# Patient Record
Sex: FEMALE | Race: WHITE | NOT HISPANIC OR LATINO | Employment: OTHER | URBAN - METROPOLITAN AREA
[De-identification: names, ages, dates, MRNs, and addresses within clinical notes are randomized per-mention and may not be internally consistent; named-entity substitution may affect disease eponyms.]

---

## 2022-01-01 ENCOUNTER — LAB REQUISITION (OUTPATIENT)
Dept: LAB | Facility: CLINIC | Age: 87
End: 2022-01-01
Payer: COMMERCIAL

## 2022-01-01 ENCOUNTER — HOSPITAL ENCOUNTER (EMERGENCY)
Facility: CLINIC | Age: 87
Discharge: HOME | End: 2022-05-04
Attending: EMERGENCY MEDICINE | Admitting: EMERGENCY MEDICINE
Payer: COMMERCIAL

## 2022-01-01 ENCOUNTER — APPOINTMENT (OUTPATIENT)
Dept: GENERAL RADIOLOGY | Facility: CLINIC | Age: 87
End: 2022-01-01
Attending: EMERGENCY MEDICINE
Payer: COMMERCIAL

## 2022-01-01 ENCOUNTER — APPOINTMENT (OUTPATIENT)
Dept: CT IMAGING | Facility: CLINIC | Age: 87
End: 2022-01-01
Attending: EMERGENCY MEDICINE
Payer: COMMERCIAL

## 2022-01-01 VITALS
DIASTOLIC BLOOD PRESSURE: 67 MMHG | RESPIRATION RATE: 16 BRPM | HEART RATE: 77 BPM | SYSTOLIC BLOOD PRESSURE: 126 MMHG | OXYGEN SATURATION: 95 % | TEMPERATURE: 97.3 F

## 2022-01-01 DIAGNOSIS — N30.00 ACUTE CYSTITIS WITHOUT HEMATURIA: ICD-10-CM

## 2022-01-01 DIAGNOSIS — I10 ESSENTIAL (PRIMARY) HYPERTENSION: ICD-10-CM

## 2022-01-01 DIAGNOSIS — G93.40 ENCEPHALOPATHY, UNSPECIFIED: ICD-10-CM

## 2022-01-01 DIAGNOSIS — E03.9 HYPOTHYROIDISM, UNSPECIFIED: ICD-10-CM

## 2022-01-01 DIAGNOSIS — R29.6 FALLS FREQUENTLY: ICD-10-CM

## 2022-01-01 DIAGNOSIS — E21.3 HYPERPARATHYROIDISM, UNSPECIFIED (H): ICD-10-CM

## 2022-01-01 DIAGNOSIS — M54.50 MIDLINE LOW BACK PAIN WITHOUT SCIATICA, UNSPECIFIED CHRONICITY: ICD-10-CM

## 2022-01-01 DIAGNOSIS — M25.552 HIP PAIN, LEFT: ICD-10-CM

## 2022-01-01 DIAGNOSIS — M81.0 AGE-RELATED OSTEOPOROSIS WITHOUT CURRENT PATHOLOGICAL FRACTURE: ICD-10-CM

## 2022-01-01 DIAGNOSIS — E83.52 HYPERCALCEMIA: ICD-10-CM

## 2022-01-01 LAB
ALBUMIN SERPL-MCNC: 3.6 G/DL (ref 3.4–5)
ALBUMIN UR-MCNC: NEGATIVE MG/DL
ALP SERPL-CCNC: 106 U/L (ref 40–150)
ALT SERPL W P-5'-P-CCNC: 16 U/L (ref 0–50)
ANION GAP SERPL CALCULATED.3IONS-SCNC: 7 MMOL/L (ref 3–14)
ANION GAP SERPL CALCULATED.3IONS-SCNC: 9 MMOL/L (ref 3–14)
APPEARANCE UR: ABNORMAL
AST SERPL W P-5'-P-CCNC: 14 U/L (ref 0–45)
BACTERIA UR CULT: ABNORMAL
BASOPHILS # BLD AUTO: 0 10E3/UL (ref 0–0.2)
BASOPHILS NFR BLD AUTO: 0 %
BILIRUB SERPL-MCNC: 0.3 MG/DL (ref 0.2–1.3)
BILIRUB UR QL STRIP: NEGATIVE
BUN SERPL-MCNC: 25 MG/DL (ref 7–30)
BUN SERPL-MCNC: 35 MG/DL (ref 7–30)
CALCIUM SERPL-MCNC: 10.3 MG/DL (ref 8.5–10.1)
CALCIUM SERPL-MCNC: 10.9 MG/DL (ref 8.5–10.1)
CHLORIDE BLD-SCNC: 101 MMOL/L (ref 94–109)
CHLORIDE BLD-SCNC: 104 MMOL/L (ref 94–109)
CO2 SERPL-SCNC: 26 MMOL/L (ref 20–32)
CO2 SERPL-SCNC: 28 MMOL/L (ref 20–32)
COLOR UR AUTO: YELLOW
CREAT SERPL-MCNC: 1.02 MG/DL (ref 0.52–1.04)
CREAT SERPL-MCNC: 1.2 MG/DL (ref 0.52–1.04)
DEPRECATED CALCIDIOL+CALCIFEROL SERPL-MC: 59 UG/L (ref 20–75)
EOSINOPHIL # BLD AUTO: 0.3 10E3/UL (ref 0–0.7)
EOSINOPHIL NFR BLD AUTO: 4 %
ERYTHROCYTE [DISTWIDTH] IN BLOOD BY AUTOMATED COUNT: 14.7 % (ref 10–15)
ERYTHROCYTE [DISTWIDTH] IN BLOOD BY AUTOMATED COUNT: 14.8 % (ref 10–15)
GFR SERPL CREATININE-BSD FRML MDRD: 43 ML/MIN/1.73M2
GFR SERPL CREATININE-BSD FRML MDRD: 52 ML/MIN/1.73M2
GLUCOSE BLD-MCNC: 101 MG/DL (ref 70–99)
GLUCOSE BLD-MCNC: 88 MG/DL (ref 70–99)
GLUCOSE UR STRIP-MCNC: NEGATIVE MG/DL
HCT VFR BLD AUTO: 37.6 % (ref 35–47)
HCT VFR BLD AUTO: 39.2 % (ref 35–47)
HGB BLD-MCNC: 12.3 G/DL (ref 11.7–15.7)
HGB BLD-MCNC: 12.4 G/DL (ref 11.7–15.7)
HGB UR QL STRIP: ABNORMAL
HYALINE CASTS: 7 /LPF
IMM GRANULOCYTES # BLD: 0.1 10E3/UL
IMM GRANULOCYTES NFR BLD: 1 %
KETONES UR STRIP-MCNC: NEGATIVE MG/DL
LEUKOCYTE ESTERASE UR QL STRIP: ABNORMAL
LYMPHOCYTES # BLD AUTO: 1.3 10E3/UL (ref 0.8–5.3)
LYMPHOCYTES NFR BLD AUTO: 18 %
MCH RBC QN AUTO: 30 PG (ref 26.5–33)
MCH RBC QN AUTO: 30.4 PG (ref 26.5–33)
MCHC RBC AUTO-ENTMCNC: 31.6 G/DL (ref 31.5–36.5)
MCHC RBC AUTO-ENTMCNC: 32.7 G/DL (ref 31.5–36.5)
MCV RBC AUTO: 93 FL (ref 78–100)
MCV RBC AUTO: 95 FL (ref 78–100)
MONOCYTES # BLD AUTO: 0.5 10E3/UL (ref 0–1.3)
MONOCYTES NFR BLD AUTO: 7 %
MUCOUS THREADS #/AREA URNS LPF: PRESENT /LPF
NEUTROPHILS # BLD AUTO: 5.5 10E3/UL (ref 1.6–8.3)
NEUTROPHILS NFR BLD AUTO: 70 %
NITRATE UR QL: POSITIVE
NRBC # BLD AUTO: 0 10E3/UL
NRBC BLD AUTO-RTO: 0 /100
PH UR STRIP: 5 [PH] (ref 5–7)
PLATELET # BLD AUTO: 263 10E3/UL (ref 150–450)
PLATELET # BLD AUTO: 284 10E3/UL (ref 150–450)
POTASSIUM BLD-SCNC: 3.7 MMOL/L (ref 3.4–5.3)
POTASSIUM BLD-SCNC: 4.4 MMOL/L (ref 3.4–5.3)
PROT SERPL-MCNC: 7.5 G/DL (ref 6.8–8.8)
PTH-INTACT SERPL-MCNC: 24 PG/ML (ref 18–80)
RBC # BLD AUTO: 4.05 10E6/UL (ref 3.8–5.2)
RBC # BLD AUTO: 4.13 10E6/UL (ref 3.8–5.2)
RBC URINE: 2 /HPF
SARS-COV-2 RNA RESP QL NAA+PROBE: NEGATIVE
SODIUM SERPL-SCNC: 136 MMOL/L (ref 133–144)
SODIUM SERPL-SCNC: 139 MMOL/L (ref 133–144)
SP GR UR STRIP: 1.02 (ref 1–1.03)
SQUAMOUS EPITHELIAL: 2 /HPF
T4 FREE SERPL-MCNC: 1.07 NG/DL (ref 0.76–1.46)
TSH SERPL DL<=0.005 MIU/L-ACNC: 18.03 MU/L (ref 0.4–4)
UROBILINOGEN UR STRIP-MCNC: NORMAL MG/DL
WBC # BLD AUTO: 5.8 10E3/UL (ref 4–11)
WBC # BLD AUTO: 7.7 10E3/UL (ref 4–11)
WBC URINE: 22 /HPF

## 2022-01-01 PROCEDURE — 36415 COLL VENOUS BLD VENIPUNCTURE: CPT | Performed by: EMERGENCY MEDICINE

## 2022-01-01 PROCEDURE — 36415 COLL VENOUS BLD VENIPUNCTURE: CPT | Mod: ORL | Performed by: PHYSICIAN ASSISTANT

## 2022-01-01 PROCEDURE — 82306 VITAMIN D 25 HYDROXY: CPT | Mod: ORL | Performed by: PHYSICIAN ASSISTANT

## 2022-01-01 PROCEDURE — 99285 EMERGENCY DEPT VISIT HI MDM: CPT | Mod: 25 | Performed by: EMERGENCY MEDICINE

## 2022-01-01 PROCEDURE — 93010 ELECTROCARDIOGRAM REPORT: CPT | Performed by: EMERGENCY MEDICINE

## 2022-01-01 PROCEDURE — 80053 COMPREHEN METABOLIC PANEL: CPT | Mod: ORL | Performed by: PHYSICIAN ASSISTANT

## 2022-01-01 PROCEDURE — 80048 BASIC METABOLIC PNL TOTAL CA: CPT | Performed by: EMERGENCY MEDICINE

## 2022-01-01 PROCEDURE — 70450 CT HEAD/BRAIN W/O DYE: CPT

## 2022-01-01 PROCEDURE — 84439 ASSAY OF FREE THYROXINE: CPT | Mod: ORL | Performed by: PHYSICIAN ASSISTANT

## 2022-01-01 PROCEDURE — 85027 COMPLETE CBC AUTOMATED: CPT | Mod: ORL | Performed by: PHYSICIAN ASSISTANT

## 2022-01-01 PROCEDURE — 87086 URINE CULTURE/COLONY COUNT: CPT | Performed by: EMERGENCY MEDICINE

## 2022-01-01 PROCEDURE — 85025 COMPLETE CBC W/AUTO DIFF WBC: CPT | Performed by: EMERGENCY MEDICINE

## 2022-01-01 PROCEDURE — 72125 CT NECK SPINE W/O DYE: CPT

## 2022-01-01 PROCEDURE — 81001 URINALYSIS AUTO W/SCOPE: CPT | Performed by: EMERGENCY MEDICINE

## 2022-01-01 PROCEDURE — 73502 X-RAY EXAM HIP UNI 2-3 VIEWS: CPT

## 2022-01-01 PROCEDURE — 83970 ASSAY OF PARATHORMONE: CPT | Mod: ORL | Performed by: PHYSICIAN ASSISTANT

## 2022-01-01 PROCEDURE — 93005 ELECTROCARDIOGRAM TRACING: CPT | Performed by: EMERGENCY MEDICINE

## 2022-01-01 PROCEDURE — U0003 INFECTIOUS AGENT DETECTION BY NUCLEIC ACID (DNA OR RNA); SEVERE ACUTE RESPIRATORY SYNDROME CORONAVIRUS 2 (SARS-COV-2) (CORONAVIRUS DISEASE [COVID-19]), AMPLIFIED PROBE TECHNIQUE, MAKING USE OF HIGH THROUGHPUT TECHNOLOGIES AS DESCRIBED BY CMS-2020-01-R: HCPCS | Performed by: PHYSICIAN ASSISTANT

## 2022-01-01 PROCEDURE — 84443 ASSAY THYROID STIM HORMONE: CPT | Mod: ORL | Performed by: PHYSICIAN ASSISTANT

## 2022-01-01 PROCEDURE — 72131 CT LUMBAR SPINE W/O DYE: CPT

## 2022-01-01 RX ORDER — CEPHALEXIN 500 MG/1
500 CAPSULE ORAL 3 TIMES DAILY
Qty: 21 CAPSULE | Refills: 0 | Status: SHIPPED | OUTPATIENT
Start: 2022-01-01 | End: 2022-01-01

## 2022-05-03 PROBLEM — F03.918 DEMENTIA WITH BEHAVIORAL DISTURBANCE (H): Status: ACTIVE | Noted: 2022-01-01

## 2022-05-03 PROBLEM — R41.82 ALTERED MENTAL STATUS: Status: ACTIVE | Noted: 2022-01-01

## 2022-05-03 PROBLEM — G89.29 CHRONIC PAIN OF RIGHT KNEE: Status: ACTIVE | Noted: 2022-01-01

## 2022-05-03 PROBLEM — G89.29 CHRONIC LOW BACK PAIN: Status: ACTIVE | Noted: 2022-01-01

## 2022-05-03 PROBLEM — S32.040A CLOSED COMPRESSION FRACTURE OF L4 VERTEBRA (H): Status: ACTIVE | Noted: 2019-05-17

## 2022-05-03 PROBLEM — M54.50 CHRONIC LOW BACK PAIN: Status: ACTIVE | Noted: 2022-01-01

## 2022-05-03 PROBLEM — F32.2 DEPRESSION, MAJOR, SINGLE EPISODE, SEVERE (H): Status: ACTIVE | Noted: 2018-11-14

## 2022-05-03 PROBLEM — M25.561 CHRONIC PAIN OF RIGHT KNEE: Status: ACTIVE | Noted: 2022-01-01

## 2022-05-03 PROBLEM — F05 DELIRIUM DUE TO GENERAL MEDICAL CONDITION: Status: ACTIVE | Noted: 2022-01-01

## 2022-05-04 NOTE — ED NOTES
Writer spoke with Chelsie from [facility] at [facility] for a patient update.  Patient will go back with EMS transport.

## 2022-05-04 NOTE — ED PROVIDER NOTES
"  History     Chief Complaint   Patient presents with     Fall     HPI  Sara Teresa is a 89 year old female who is brought in by ambulance from nursing home after frequent falls.  She does have a history of chronic low back pain with compression fractures of her lumbar spine, dementia without behavioral disturbance, hypertension, depression, and hyperlipidemia.  She reports that she has had 3-4 falls over the past week this is an estimate.  Tonight she was lying on the cot and thinks that she fell off onto her left side.  She complains of low back pain and left hip pain.  States that she was on the floor for \"quite a while\".  When asked to get some idea of how long this was she simply does not know.  Normally uses a walker at baseline.  Has otherwise been feeling well.  Slight decreased appetite and oral intake.  No chest pain, pressure, palpitations.  No sore throat.  No nausea or vomiting.  No fevers or chills.  Denies urinary symptoms.    The patient's PMHx, Surgical Hx, Allergies, and Medications were all reviewed with the patient.    Allergies:  Allergies   Allergen Reactions     Ciprofloxacin      Codeine      Hydrocodone      Iodine      Prevacid [Lansoprazole]      Sulfa Drugs        Problem List:    Patient Active Problem List    Diagnosis Date Noted     Altered mental status 03/21/2022     Priority: Medium     Chronic low back pain 03/21/2022     Priority: Medium     Dementia with behavioral disturbance (H) 03/21/2022     Priority: Medium     Chronic pain of right knee 03/21/2022     Priority: Medium     Delirium due to general medical condition 03/21/2022     Priority: Medium     Closed compression fracture of L4 vertebra (H) 05/17/2019     Priority: Medium     Depression, major, single episode, severe (H) 11/14/2018     Priority: Medium     HLD (hyperlipidemia) 09/26/2013     Priority: Medium     Essential hypertension, benign 11/30/2006     Priority: Medium     Formatting of this note might be " different from the original.  Norvasc 10 mg , toprol XL 25 mg BID  takes  also  for palpitations   BID  as per  cardiology  , Asa 81 mg       Back disorder 11/29/2005     Priority: Medium     Formatting of this note might be different from the original.  L1 com.  2. Multilevel degenerative disc disease including foraminal stenosis    at L4-L5 and L5-S1 No significant spinal    stenosis.  Treated Ortho Dr Diaz 9/08  Had epidural Rx     ; LOW BACK PAIN  - CHRONIC          Past Medical History:    No past medical history on file.    Past Surgical History:    No past surgical history on file.    Family History:    No family history on file.    Social History:  Marital Status:          Medications:    No current outpatient medications on file.        Review of Systems  A complete review of systems performed and is otherwise negative except as noted in the HPI    Physical Exam   BP: (!) 140/72  Pulse: 82  Temp: 97.3  F (36.3  C)  Resp: 16  SpO2: 94 %    Physical Exam  GEN: Awake, alert, and cooperative. Appears distressed   HENT: No overt traumatic injuries.  No hemotympanum.  No otorrhea.  No retroauricular ecchymosis or tenderness.  No facial tenderness.  No malocclusion of teeth.  EYES: EOM intact. Conjunctiva clear. No discharge.  No periorbital ecchymosis  NECK: Symmetric, freely mobile.  No localized tenderness.  CV : Regular rate and rhythm.  Extremities warm and well perfused  PULM: Normal effort. No wheezes, rales, or rhonchi bilaterally.  ABD: Soft, non-tender, non-distended. No rebound or guarding.   BACK: Midline tenderness to palpation in mid lumbar spine.  NEURO: Normal speech. Following commands. CN II-XII grossly intact. Answering questions and interacting appropriately.  No focal motor or sensory deficit.  Sensation intact to light touch in upper and lower extremities.  EXT: no shortening of legs. Partially headed abrasions in bilateral anterior knee. Bruising to left lateral thigh with tenderness to  palpation. Able to tolerate passive range of motion of both hips with internal and external rotation. No tenderness of hands, wrists, elbows, shoulders, clavicle, ankles, feet or knees.  INT: Warm and dry. Scattered bruising and abrasions.        ED Course        Procedures         EKG: Interpreted by myself.  Sinus rhythm with rate of 77 bpm.  Normal axis.  Incomplete right bundle branch block pattern.  T wave inversion in precordial leads.  No prior EKG for comparison.  Abnormal EKG.    Critical Care time:  none  Lumbar spine CT w/o contrast   Final Result   IMPRESSION:   1.  Moderate compression of L2 with changes of vertebroplasty. Moderate compression of L4. Mild compression of L1. Allowing for differences in technique, these are similar to prior.   2.  Extensive degenerative changes, as described.      Pelvis XR w/ unilateral hip left   Final Result   IMPRESSION: No change. Previous left hip arthroplasty components including cerclage wires are unchanged. Alignment normal. No new fracture evident.      No change in right hip demonstrating hip pinning of remote subcapital fracture. Advanced DJD right hip.      CT Cervical Spine w/o Contrast   Final Result   IMPRESSION:   HEAD CT:   1.  No acute intracranial process.      CERVICAL SPINE CT:   1.  No CT evidence for acute fracture or post traumatic subluxation.      CT Head w/o Contrast   Final Result   IMPRESSION:   HEAD CT:   1.  No acute intracranial process.      CERVICAL SPINE CT:   1.  No CT evidence for acute fracture or post traumatic subluxation.        Labs Ordered and Resulted from Time of ED Arrival to Time of ED Departure   BASIC METABOLIC PANEL - Abnormal       Result Value    Sodium 139      Potassium 4.4      Chloride 104      Carbon Dioxide (CO2) 28      Anion Gap 7      Urea Nitrogen 35 (*)     Creatinine 1.20 (*)     Calcium 10.9 (*)     Glucose 101 (*)     GFR Estimate 43 (*)    ROUTINE UA WITH MICROSCOPIC REFLEX TO CULTURE - Abnormal    Color  Urine Yellow      Appearance Urine Slightly Cloudy (*)     Glucose Urine Negative      Bilirubin Urine Negative      Ketones Urine Negative      Specific Gravity Urine 1.018      Blood Urine Small (*)     pH Urine 5.0      Protein Albumin Urine Negative      Urobilinogen Urine Normal      Nitrite Urine Positive (*)     Leukocyte Esterase Urine Small (*)     Mucus Urine Present (*)     RBC Urine 2      WBC Urine 22 (*)     Squamous Epithelials Urine 2 (*)     Hyaline Casts Urine 7 (*)    CBC WITH PLATELETS AND DIFFERENTIAL    WBC Count 7.7      RBC Count 4.05      Hemoglobin 12.3      Hematocrit 37.6      MCV 93      MCH 30.4      MCHC 32.7      RDW 14.7      Platelet Count 263      % Neutrophils 70      % Lymphocytes 18      % Monocytes 7      % Eosinophils 4      % Basophils 0      % Immature Granulocytes 1      NRBCs per 100 WBC 0      Absolute Neutrophils 5.5      Absolute Lymphocytes 1.3      Absolute Monocytes 0.5      Absolute Eosinophils 0.3      Absolute Basophils 0.0      Absolute Immature Granulocytes 0.1      Absolute NRBCs 0.0       Medications - No data to display      Assessments & Plan (with Medical Decision Making)   89 year old female with past medical history dementia, hypertension, fall risk, compression fractures lumbar spine, old pubic rami fractures with frequent falls over past week today with pain in low back and left hip.    Low back tenderness on exam, left lateral hip tenderness w/ ecchymosis. Able to tolerate passive range of motion. No shortening of LLE. Abrasions on anterior knees bilaterally which are partially healing which would be consistent with her hx of several recent falls. No signs of basilar skull fracture on exam, non contrast CT of head and cervical spine without acute pathology. XR of pelvis/hip shows advanced DJD but no acute fracture. CT lumbar spine redemonstrate known compression fractures of L1, L2, and L4. CBC normal BMP with elevation of creatine, BUN 35, calcium of  10.9. likely prerenal azotemia. She is tolerating oral fluids in the department. UA appears acutely infected, culture pending. Will treat UTI empirically with cephalexin TID x 7 days. If hip pain persists would recommend repeat imaging.     I have reviewed the nursing notes.         New Prescriptions    No medications on file       Final diagnoses:   Acute cystitis without hematuria   Midline low back pain without sciatica, unspecified chronicity   Hip pain, left   Falls frequently     Jerel Goyal MD    5/3/2022   [facility] EMERGENCY DEPT    Disclaimer: This note consists of words and symbols derived from keyboarding and dictation using voice recognition software.  As a result, there may be errors that have gone undetected.  Please consider this when interpreting information found in this note.             Jerel Goyal MD  05/06/22 [id number]

## 2022-05-04 NOTE — DISCHARGE INSTRUCTIONS
Imaging did not show any new broken bones.    Your urine appears infected.  Urine culture is pending and should be resulted 1 to 2 days.  Take cephalexin 3 times per day for the next 7 days pending culture results.    Follow-up with your primary care provider later this week if not feeling better.    If you have continued to have pain in her left hip which be reevaluated and consideration of repeat imaging.

## 2022-05-04 NOTE — ED TRIAGE NOTES
Pt from [facility]. Ems states she had an unwitnessed fall out of her bed today. Also had another fall today. They are concerned with her falling. Pt has chronic pain and take pain pills for it. Pt states they gave her 2 today. Pt c/o back pain and left hip pain.

## 2022-05-05 NOTE — RESULT ENCOUNTER NOTE
[facility] Emergency Dept discharge antibiotic (if prescribed): Cephalexin (Keflex) 500 mg capsule, 1 capsule (500 mg) by mouth 3 times daily for 7 days.   Date of Rx (if applicable):  5/4/22  No changes in treatment per [facility] ED Lab Result Urine culture protocol.